# Patient Record
Sex: MALE | Race: WHITE | NOT HISPANIC OR LATINO | Employment: UNEMPLOYED | URBAN - METROPOLITAN AREA
[De-identification: names, ages, dates, MRNs, and addresses within clinical notes are randomized per-mention and may not be internally consistent; named-entity substitution may affect disease eponyms.]

---

## 2019-06-06 ENCOUNTER — APPOINTMENT (EMERGENCY)
Dept: RADIOLOGY | Facility: HOSPITAL | Age: 44
End: 2019-06-06
Payer: COMMERCIAL

## 2019-06-06 ENCOUNTER — HOSPITAL ENCOUNTER (EMERGENCY)
Facility: HOSPITAL | Age: 44
Discharge: HOME | End: 2019-06-06
Attending: EMERGENCY MEDICINE | Admitting: EMERGENCY MEDICINE
Payer: COMMERCIAL

## 2019-06-06 VITALS
RESPIRATION RATE: 16 BRPM | HEART RATE: 79 BPM | SYSTOLIC BLOOD PRESSURE: 119 MMHG | OXYGEN SATURATION: 96 % | WEIGHT: 157.63 LBS | TEMPERATURE: 97.8 F | DIASTOLIC BLOOD PRESSURE: 73 MMHG

## 2019-06-06 DIAGNOSIS — M25.532 ACUTE PAIN OF LEFT WRIST: Primary | ICD-10-CM

## 2019-06-06 PROCEDURE — 99283 EMERGENCY DEPT VISIT LOW MDM: CPT | Performed by: PHYSICIAN ASSISTANT

## 2019-06-06 PROCEDURE — 73110 X-RAY EXAM OF WRIST: CPT

## 2019-06-06 PROCEDURE — 99283 EMERGENCY DEPT VISIT LOW MDM: CPT

## 2019-06-06 PROCEDURE — 73130 X-RAY EXAM OF HAND: CPT

## 2020-07-20 ENCOUNTER — APPOINTMENT (EMERGENCY)
Dept: RADIOLOGY | Facility: HOSPITAL | Age: 45
End: 2020-07-20
Payer: COMMERCIAL

## 2020-07-20 ENCOUNTER — HOSPITAL ENCOUNTER (EMERGENCY)
Facility: HOSPITAL | Age: 45
Discharge: HOME | End: 2020-07-20
Payer: COMMERCIAL

## 2020-07-20 VITALS
WEIGHT: 150 LBS | RESPIRATION RATE: 18 BRPM | SYSTOLIC BLOOD PRESSURE: 122 MMHG | TEMPERATURE: 98.5 F | OXYGEN SATURATION: 98 % | HEIGHT: 66 IN | HEART RATE: 69 BPM | DIASTOLIC BLOOD PRESSURE: 81 MMHG | BODY MASS INDEX: 24.11 KG/M2

## 2020-07-20 DIAGNOSIS — R07.9 CHEST PAIN, UNSPECIFIED TYPE: Primary | ICD-10-CM

## 2020-07-20 LAB
ANION GAP SERPL CALCULATED.3IONS-SCNC: 10 MMOL/L (ref 4–13)
BASOPHILS # BLD AUTO: 0.06 THOUSANDS/ΜL (ref 0–0.1)
BASOPHILS NFR BLD AUTO: 1 % (ref 0–1)
BUN SERPL-MCNC: 14 MG/DL (ref 6–20)
CALCIUM SERPL-MCNC: 10.1 MG/DL (ref 8.4–10.2)
CHLORIDE SERPL-SCNC: 103 MMOL/L (ref 96–108)
CO2 SERPL-SCNC: 26 MMOL/L (ref 22–33)
CREAT SERPL-MCNC: 1.21 MG/DL (ref 0.5–1.2)
D DIMER PPP FEU-MCNC: <0.19 MG/L FEU (ref 0.19–0.49)
EOSINOPHIL # BLD AUTO: 0.13 THOUSAND/ΜL (ref 0–0.61)
EOSINOPHIL NFR BLD AUTO: 1 % (ref 0–6)
ERYTHROCYTE [DISTWIDTH] IN BLOOD BY AUTOMATED COUNT: 14 % (ref 11.6–15.1)
GFR SERPL CREATININE-BSD FRML MDRD: 73 ML/MIN/1.73SQ M
GLUCOSE SERPL-MCNC: 88 MG/DL (ref 65–140)
HCT VFR BLD AUTO: 48.9 % (ref 36.5–49.3)
HGB BLD-MCNC: 16.6 G/DL (ref 12–17)
IMM GRANULOCYTES # BLD AUTO: 0.03 THOUSAND/UL (ref 0–0.2)
IMM GRANULOCYTES NFR BLD AUTO: 0 % (ref 0–2)
LYMPHOCYTES # BLD AUTO: 1.8 THOUSANDS/ΜL (ref 0.6–4.47)
LYMPHOCYTES NFR BLD AUTO: 18 % (ref 14–44)
MCH RBC QN AUTO: 28.7 PG (ref 26.8–34.3)
MCHC RBC AUTO-ENTMCNC: 33.9 G/DL (ref 31.4–37.4)
MCV RBC AUTO: 85 FL (ref 82–98)
MONOCYTES # BLD AUTO: 0.93 THOUSAND/ΜL (ref 0.17–1.22)
MONOCYTES NFR BLD AUTO: 10 % (ref 4–12)
NEUTROPHILS # BLD AUTO: 6.83 THOUSANDS/ΜL (ref 1.85–7.62)
NEUTS SEG NFR BLD AUTO: 70 % (ref 43–75)
PLATELET # BLD AUTO: 297 THOUSANDS/UL (ref 149–390)
PMV BLD AUTO: 10.5 FL (ref 8.9–12.7)
POTASSIUM SERPL-SCNC: 4 MMOL/L (ref 3.5–5)
RBC # BLD AUTO: 5.79 MILLION/UL (ref 3.88–5.62)
SODIUM SERPL-SCNC: 139 MMOL/L (ref 133–145)
TROPONIN I SERPL-MCNC: <0.03 NG/ML (ref 0–0.07)
WBC # BLD AUTO: 9.78 THOUSAND/UL (ref 4.31–10.16)

## 2020-07-20 PROCEDURE — 71045 X-RAY EXAM CHEST 1 VIEW: CPT

## 2020-07-20 PROCEDURE — 80048 BASIC METABOLIC PNL TOTAL CA: CPT

## 2020-07-20 PROCEDURE — 99285 EMERGENCY DEPT VISIT HI MDM: CPT

## 2020-07-20 PROCEDURE — 36415 COLL VENOUS BLD VENIPUNCTURE: CPT

## 2020-07-20 PROCEDURE — 84484 ASSAY OF TROPONIN QUANT: CPT

## 2020-07-20 PROCEDURE — 85379 FIBRIN DEGRADATION QUANT: CPT

## 2020-07-20 PROCEDURE — 85025 COMPLETE CBC W/AUTO DIFF WBC: CPT

## 2020-07-20 RX ORDER — SODIUM CHLORIDE 9 MG/ML
3 INJECTION INTRAVENOUS
Status: DISCONTINUED | OUTPATIENT
Start: 2020-07-20 | End: 2020-07-20 | Stop reason: HOSPADM

## 2020-07-20 RX ORDER — ATORVASTATIN CALCIUM 10 MG/1
10 TABLET, FILM COATED ORAL DAILY
COMMUNITY
Start: 2020-06-01

## 2020-07-20 NOTE — ED PROVIDER NOTES
History  Chief Complaint   Patient presents with    Chest Pain     Reports intermittent LEFT sided CP that radiates into epigastric area  Denies cough, SOB  hx cholecystectomy     62-year-old male history of elevated cholesterol presents secondary to left-sided chest pain intermittently for the past few days  Patient is here today because his wife is concerned because the pain seemed worse today  Patient denies any palpitation denies any dyspnea patient denies any nausea or vomiting  Patient states he has had EKGs in a stress test a few years ago which were unremarkable  Patient states he has been under lot of stress lately  Patient is awake alert oriented no significant distress  Prior to Admission Medications   Prescriptions Last Dose Informant Patient Reported? Taking?   atorvastatin (LIPITOR) 10 mg tablet 7/20/2020 at Unknown time  Yes Yes   Sig: Take 10 mg by mouth daily       Facility-Administered Medications: None       Past Medical History:   Diagnosis Date    Hypercholesterolemia     [facility]     No known problems        Past Surgical History:   Procedure Laterality Date    CHOLECYSTECTOMY      CHOLECYSTECTOMY         History reviewed  No pertinent family history  I have reviewed and agree with the history as documented  E-Cigarette/Vaping    E-Cigarette Use Never User      E-Cigarette/Vaping Substances     Social History     Tobacco Use    Smoking status: Never Smoker    Smokeless tobacco: Never Used   Substance Use Topics    Alcohol use: Never     Frequency: Never    Drug use: Never       Review of Systems   Constitutional: Negative for chills and fever  HENT: Negative for congestion  Eyes: Negative for visual disturbance  Respiratory: Negative for shortness of breath  Cardiovascular: Positive for chest pain  Gastrointestinal: Negative for abdominal pain  Endocrine: Negative for cold intolerance  Genitourinary: Negative for frequency  Musculoskeletal: Negative for gait problem  Skin: Negative for rash  Neurological: Negative for dizziness  Psychiatric/Behavioral: Negative for behavioral problems and confusion  Physical Exam  Physical Exam   Constitutional: He is oriented to person, place, and time  He appears well-developed and well-nourished  HENT:   Head: Normocephalic and atraumatic  Eyes: Pupils are equal, round, and reactive to light  Conjunctivae and EOM are normal    Neck: Normal range of motion  Neck supple  Cardiovascular: Normal rate, regular rhythm and normal heart sounds  Pulmonary/Chest: Effort normal and breath sounds normal    Abdominal: Soft  Bowel sounds are normal    Musculoskeletal: Normal range of motion  Neurological: He is alert and oriented to person, place, and time  Skin: Skin is warm and dry  Capillary refill takes less than 2 seconds  Psychiatric: He has a normal mood and affect  His behavior is normal    Nursing note and vitals reviewed        Vital Signs  ED Triage Vitals [07/20/20 1034]   Temperature Pulse Respirations Blood Pressure SpO2   98 5 °F (36 9 °C) 76 16 132/83 96 %      Temp Source Heart Rate Source Patient Position - Orthostatic VS BP Location FiO2 (%)   Tympanic -- Sitting Left arm --      Pain Score       --           Vitals:    07/20/20 1034 07/20/20 1134   BP: 132/83 122/81   Pulse: 76 69   Patient Position - Orthostatic VS: Sitting Lying         Visual Acuity      ED Medications  Medications   sodium chloride (PF) 0 9 % injection 3 mL (has no administration in time range)       Diagnostic Studies  Results Reviewed     Procedure Component Value Units Date/Time    D-dimer, quantitative [[id number]]  (Abnormal) Collected:  07/20/20 1057    Lab Status:  Final result Specimen:  Blood from Arm, Right Updated:  07/20/20 1142     D-Dimer, Quant  <0 19 mg/L FEU     Troponin I [[id number]]  (Normal) Collected:  07/20/20 1057    Lab Status:  Final result Specimen:  Blood from Arm, Right Updated:  07/20/20 1126     Troponin I <0 03 ng/mL     CBC and differential [[id number]]  (Abnormal) Collected:  07/20/20 1057    Lab Status:  Final result Specimen:  Blood from Arm, Right Updated:  07/20/20 1103     WBC 9 78 Thousand/uL      RBC 5 79 Million/uL      Hemoglobin 16 6 g/dL      Hematocrit 48 9 %      MCV 85 fL      MCH 28 7 pg      MCHC 33 9 g/dL      RDW 14 0 %      MPV 10 5 fL      Platelets 820 Thousands/uL      Neutrophils Relative 70 %      Immat GRANS % 0 %      Lymphocytes Relative 18 %      Monocytes Relative 10 %      Eosinophils Relative 1 %      Basophils Relative 1 %      Neutrophils Absolute 6 83 Thousands/µL      Immature Grans Absolute 0 03 Thousand/uL      Lymphocytes Absolute 1 80 Thousands/µL      Monocytes Absolute 0 93 Thousand/µL      Eosinophils Absolute 0 13 Thousand/µL      Basophils Absolute 0 06 Thousands/µL     Basic metabolic panel [[id number]] Collected:  07/20/20 1057    Lab Status: In process Specimen:  Blood from Arm, Right Updated:  07/20/20 1100                 X-ray chest 1 view portable   Final Result by Aida Flores MD (07/20 1124)      No acute cardiopulmonary disease  Workstation performed: [id number]                    Procedures  ECG 12 Lead Documentation Only  Date/Time: 7/20/2020 10:59 AM  Performed by: Kim Langley MD  Authorized by: Kim Langley MD     Indications / Diagnosis:  Chest pain  ECG reviewed by me, the ED Provider: yes    Comments:      Normal sinus rhythm no acute ST T wave abnormalities normal axis normal intervals  ED Course       US AUDIT      Most Recent Value   Initial Alcohol Screen: US AUDIT-C    1  How often do you have a drink containing alcohol?  0 Filed at: 07/20/2020 1037   2  How many drinks containing alcohol do you have on a typical day you are drinking? 0 Filed at: 07/20/2020 1037   3a  Male UNDER 65: How often do you have five or more drinks on one occasion?   0 Filed at: 07/20/2020 1037 3b  FEMALE Any Age, or MALE 65+: How often do you have 4 or more drinks on one occassion? 0 Filed at: 07/20/2020 1037   Audit-C Score  0 Filed at: 07/20/2020 1037                  MONALISA/DAST-10      Most Recent Value   How many times in the past year have you    Used an illegal drug or used a prescription medication for non-medical reasons? Never Filed at: 07/20/2020 1037                                MDM  Patient was monitored in the emergency department for approximately 2 hours  Patient denied any chest pain while here in the emergency department  Patient's chest x-ray demonstrated no acute abnormality patient's blood work demonstrated no acute abnormalities troponin was not elevated  I spoke to patient he does have cardiac risk factors and recommended she comes in for cardiac monitoring and chest pain cardiac rule out  Patient absolutely refuses admission for this  Patient states he does in the [place] knows he is not from around here he does not want to be admitted to this hospital secondary to months ago and see his own doctor  Patient understands that his chest pain may be of cardiac etiology could be dangerous for him to leave including possibility of death he is of sound mind and understands this  Patient assures me he will call his doctor when he gets home today to set up an appointment    Disposition  Final diagnoses:   Chest pain, unspecified type     Time reflects when diagnosis was documented in both MDM as applicable and the Disposition within this note     Time User Action Codes Description Comment    7/20/2020 11:48 AM Brad Simpson Add [R07 9] Chest pain, unspecified type       ED Disposition     ED Disposition Condition Date/Time Comment    Discharge Stable Mon Jul 20, 2020 11:48 AM Monroe Aguirre discharge to home/self care              Follow-up Information     Follow up With Specialties Details Why Teri Epstein MD [facility] Medicine Schedule an appointment as soon as possible for a visit in 1 day  [address]  [phone]            Patient's Medications   Discharge Prescriptions    No medications on file     No discharge procedures on file      PDMP Review     None          ED Provider  Electronically Signed by           Jim Noel MD  07/20/20 8870

## 2020-09-27 ENCOUNTER — APPOINTMENT (EMERGENCY)
Dept: RADIOLOGY | Facility: HOSPITAL | Age: 45
End: 2020-09-27
Payer: COMMERCIAL

## 2020-09-27 ENCOUNTER — HOSPITAL ENCOUNTER (EMERGENCY)
Facility: HOSPITAL | Age: 45
Discharge: HOME | End: 2020-09-27
Attending: EMERGENCY MEDICINE
Payer: COMMERCIAL

## 2020-09-27 VITALS
HEART RATE: 52 BPM | BODY MASS INDEX: 23.53 KG/M2 | RESPIRATION RATE: 14 BRPM | OXYGEN SATURATION: 98 % | WEIGHT: 145.8 LBS | DIASTOLIC BLOOD PRESSURE: 70 MMHG | SYSTOLIC BLOOD PRESSURE: 117 MMHG | TEMPERATURE: 98.2 F

## 2020-09-27 DIAGNOSIS — R07.9 CHEST PAIN, UNSPECIFIED: Primary | ICD-10-CM

## 2020-09-27 LAB
ALBUMIN SERPL BCP-MCNC: 3.8 G/DL (ref 3.5–5)
ALP SERPL-CCNC: 60 U/L (ref 46–116)
ALT SERPL W P-5'-P-CCNC: 49 U/L (ref 12–78)
ANION GAP SERPL CALCULATED.3IONS-SCNC: 9 MMOL/L (ref 4–13)
AST SERPL W P-5'-P-CCNC: 28 U/L (ref 5–45)
BASOPHILS # BLD AUTO: 0.1 THOUSANDS/ΜL (ref 0–0.1)
BASOPHILS NFR BLD AUTO: 1 % (ref 0–1)
BILIRUB SERPL-MCNC: 0.6 MG/DL (ref 0.2–1)
BUN SERPL-MCNC: 14 MG/DL (ref 5–25)
CALCIUM SERPL-MCNC: 9.2 MG/DL (ref 8.3–10.1)
CHLORIDE SERPL-SCNC: 103 MMOL/L (ref 100–108)
CO2 SERPL-SCNC: 27 MMOL/L (ref 21–32)
CREAT SERPL-MCNC: 1.25 MG/DL (ref 0.6–1.3)
EOSINOPHIL # BLD AUTO: 0.26 THOUSAND/ΜL (ref 0–0.61)
EOSINOPHIL NFR BLD AUTO: 3 % (ref 0–6)
ERYTHROCYTE [DISTWIDTH] IN BLOOD BY AUTOMATED COUNT: 12.8 % (ref 11.6–15.1)
GFR SERPL CREATININE-BSD FRML MDRD: 70 ML/MIN/1.73SQ M
GLUCOSE SERPL-MCNC: 89 MG/DL (ref 65–140)
HCT VFR BLD AUTO: 51.1 % (ref 36.5–49.3)
HGB BLD-MCNC: 16.9 G/DL (ref 12–17)
IMM GRANULOCYTES # BLD AUTO: 0.03 THOUSAND/UL (ref 0–0.2)
IMM GRANULOCYTES NFR BLD AUTO: 0 % (ref 0–2)
LYMPHOCYTES # BLD AUTO: 2.28 THOUSANDS/ΜL (ref 0.6–4.47)
LYMPHOCYTES NFR BLD AUTO: 22 % (ref 14–44)
MCH RBC QN AUTO: 29.6 PG (ref 26.8–34.3)
MCHC RBC AUTO-ENTMCNC: 33.1 G/DL (ref 31.4–37.4)
MCV RBC AUTO: 90 FL (ref 82–98)
MONOCYTES # BLD AUTO: 0.69 THOUSAND/ΜL (ref 0.17–1.22)
MONOCYTES NFR BLD AUTO: 7 % (ref 4–12)
NEUTROPHILS # BLD AUTO: 7 THOUSANDS/ΜL (ref 1.85–7.62)
NEUTS SEG NFR BLD AUTO: 67 % (ref 43–75)
NRBC BLD AUTO-RTO: 0 /100 WBCS
PLATELET # BLD AUTO: 281 THOUSANDS/UL (ref 149–390)
PMV BLD AUTO: 10.5 FL (ref 8.9–12.7)
POTASSIUM SERPL-SCNC: 4 MMOL/L (ref 3.5–5.3)
PROT SERPL-MCNC: 7.3 G/DL (ref 6.4–8.2)
RBC # BLD AUTO: 5.71 MILLION/UL (ref 3.88–5.62)
SODIUM SERPL-SCNC: 139 MMOL/L (ref 136–145)
TROPONIN I SERPL-MCNC: <0.02 NG/ML
WBC # BLD AUTO: 10.36 THOUSAND/UL (ref 4.31–10.16)

## 2020-09-27 PROCEDURE — 93005 ELECTROCARDIOGRAM TRACING: CPT

## 2020-09-27 PROCEDURE — 99285 EMERGENCY DEPT VISIT HI MDM: CPT

## 2020-09-27 PROCEDURE — 71045 X-RAY EXAM CHEST 1 VIEW: CPT

## 2020-09-27 PROCEDURE — 84484 ASSAY OF TROPONIN QUANT: CPT

## 2020-09-27 PROCEDURE — 85025 COMPLETE CBC W/AUTO DIFF WBC: CPT

## 2020-09-27 PROCEDURE — 99285 EMERGENCY DEPT VISIT HI MDM: CPT | Performed by: EMERGENCY MEDICINE

## 2020-09-27 PROCEDURE — 36415 COLL VENOUS BLD VENIPUNCTURE: CPT

## 2020-09-27 PROCEDURE — 80053 COMPREHEN METABOLIC PANEL: CPT

## 2020-09-27 RX ORDER — IBUPROFEN 600 MG/1
600 TABLET ORAL ONCE
Status: COMPLETED | OUTPATIENT
Start: 2020-09-27 | End: 2020-09-27

## 2020-09-27 RX ADMIN — IBUPROFEN 600 MG: 600 TABLET, FILM COATED ORAL at 20:56

## 2020-09-27 NOTE — ED PROVIDER NOTES
History  Chief Complaint   Patient presents with    Chest Pain     Pt c/o on and off chest pain since 10 am   Pt describes pain as throbbing pain to left chest      Patient here with complaint of intermittent left-sided chest pain that began approximately 9:00 a m  This morning  Patient patient states that pain lasts approximately 1-2 minutes and resolves spontaneously  He describes pain as a throbbing  Patient with a history of similar pain a few months ago and was evaluated at this hospital   Patient has a previous history of hyperlipidemia and states that he is compliant with his medications  Patient denies cough, fevers, chills, sick contacts  History provided by:  Patient   used: No    Chest Pain   Pain location:  L chest  Pain quality: throbbing    Pain radiates to:  Does not radiate  Pain radiates to the back: no    Pain severity:  Mild  Onset quality:  Sudden  Timing:  Intermittent  Chronicity:  New  Associated symptoms: no abdominal pain, no back pain, no cough, no dysphagia, no fever, no headache, no nausea, no palpitations, no shortness of breath and not vomiting        Prior to Admission Medications   Prescriptions Last Dose Informant Patient Reported? Taking?   atorvastatin (LIPITOR) 10 mg tablet 9/27/2020 at Unknown time  Yes Yes   Sig: Take 10 mg by mouth daily       Facility-Administered Medications: None       Past Medical History:   Diagnosis Date    Hypercholesterolemia     [facility]     No known problems        Past Surgical History:   Procedure Laterality Date    CHOLECYSTECTOMY      CHOLECYSTECTOMY         History reviewed  No pertinent family history  I have reviewed and agree with the history as documented      E-Cigarette/Vaping    E-Cigarette Use Never User      E-Cigarette/Vaping Substances     Social History     Tobacco Use    Smoking status: Never Smoker    Smokeless tobacco: Never Used   Substance Use Topics    Alcohol use: Never Frequency: Never    Drug use: Never       Review of Systems   Constitutional: Negative for chills and fever  HENT: Negative for facial swelling and trouble swallowing  Eyes: Negative for photophobia, pain and redness  Respiratory: Negative for cough, shortness of breath and wheezing  Cardiovascular: Positive for chest pain  Negative for palpitations  Gastrointestinal: Negative for abdominal pain, constipation, diarrhea, nausea and vomiting  Genitourinary: Negative for dysuria, flank pain, hematuria and urgency  Musculoskeletal: Negative for back pain and neck pain  Skin: Negative for color change and rash  Neurological: Negative for light-headedness and headaches  Psychiatric/Behavioral: Negative for agitation, behavioral problems and confusion  All other systems reviewed and are negative  Physical Exam  Physical Exam  Vitals signs and nursing note reviewed  Constitutional:       Appearance: He is well-developed  HENT:      Head: Normocephalic and atraumatic  Eyes:      Pupils: Pupils are equal, round, and reactive to light  Cardiovascular:      Rate and Rhythm: Normal rate and regular rhythm  Heart sounds: Normal heart sounds  Pulmonary:      Effort: Pulmonary effort is normal  No tachypnea or accessory muscle usage  Breath sounds: Normal breath sounds  No decreased breath sounds, wheezing, rhonchi or rales  Abdominal:      General: Bowel sounds are normal  There is no distension  Palpations: Abdomen is soft  There is no mass  Tenderness: There is no abdominal tenderness  There is no guarding or rebound  Skin:     General: Skin is warm and dry  Capillary Refill: Capillary refill takes less than 2 seconds  Neurological:      General: No focal deficit present  Mental Status: He is alert and oriented to person, place, and time  Psychiatric:         Mood and Affect: Mood is not anxious           Behavior: Behavior normal          Thought Content:  Thought content normal          Judgment: Judgment normal          Vital Signs  ED Triage Vitals [09/27/20 1936]   Temperature Pulse Respirations Blood Pressure SpO2   98 2 °F (36 8 °C) 67 18 154/85 96 %      Temp Source Heart Rate Source Patient Position - Orthostatic VS BP Location FiO2 (%)   Oral Monitor Sitting Left arm --      Pain Score       7           Vitals:    09/27/20 1936 09/27/20 1945 09/27/20 2015 09/27/20 2045   BP: 154/85 135/84 135/88 117/70   Pulse: 67 68 60 (!) 52   Patient Position - Orthostatic VS: Sitting            Visual Acuity      ED Medications  Medications   ibuprofen (MOTRIN) tablet 600 mg (600 mg Oral Given 9/27/20 2056)       Diagnostic Studies  Results Reviewed     Procedure Component Value Units Date/Time    Troponin I [[id number]]  (Normal) Collected:  09/27/20 1947    Lab Status:  Final result Specimen:  Blood from Arm, Right Updated:  09/27/20 2012     Troponin I <0 02 ng/mL     Comprehensive metabolic panel [[id number]] Collected:  09/27/20 1947    Lab Status:  Final result Specimen:  Blood from Arm, Right Updated:  09/27/20 2010     Sodium 139 mmol/L      Potassium 4 0 mmol/L      Chloride 103 mmol/L      CO2 27 mmol/L      ANION GAP 9 mmol/L      BUN 14 mg/dL      Creatinine 1 25 mg/dL      Glucose 89 mg/dL      Calcium 9 2 mg/dL      AST 28 U/L      ALT 49 U/L      Alkaline Phosphatase 60 U/L      Total Protein 7 3 g/dL      Albumin 3 8 g/dL      Total Bilirubin 0 60 mg/dL      eGFR 70 ml/min/1 73sq m     Narrative:       Ricardo guidelines for Chronic Kidney Disease (CKD):     Stage 1 with normal or high GFR (GFR > 90 mL/min/1 73 square meters)    Stage 2 Mild CKD (GFR = 60-89 mL/min/1 73 square meters)    Stage 3A Moderate CKD (GFR = 45-59 mL/min/1 73 square meters)    Stage 3B Moderate CKD (GFR = 30-44 mL/min/1 73 square meters)    Stage 4 Severe CKD (GFR = 15-29 mL/min/1 73 square meters)    Stage 5 End Stage CKD (GFR <15 mL/min/1 73 square meters)  Note: GFR calculation is accurate only with a steady state creatinine    CBC and differential [[id number]]  (Abnormal) Collected:  09/27/20 1947    Lab Status:  Final result Specimen:  Blood from Arm, Right Updated:  09/27/20 1953     WBC 10 36 Thousand/uL      RBC 5 71 Million/uL      Hemoglobin 16 9 g/dL      Hematocrit 51 1 %      MCV 90 fL      MCH 29 6 pg      MCHC 33 1 g/dL      RDW 12 8 %      MPV 10 5 fL      Platelets 741 Thousands/uL      nRBC 0 /100 WBCs      Neutrophils Relative 67 %      Immat GRANS % 0 %      Lymphocytes Relative 22 %      Monocytes Relative 7 %      Eosinophils Relative 3 %      Basophils Relative 1 %      Neutrophils Absolute 7 00 Thousands/µL      Immature Grans Absolute 0 03 Thousand/uL      Lymphocytes Absolute 2 28 Thousands/µL      Monocytes Absolute 0 69 Thousand/µL      Eosinophils Absolute 0 26 Thousand/µL      Basophils Absolute 0 10 Thousands/µL                  XR chest 1 view portable   ED Interpretation by Natalia Conteh DO (09/27 2040)   nad                 Procedures  ECG 12 Lead Documentation Only    Date/Time: 9/27/2020 7:43 PM  Performed by: Natalia Conteh DO  Authorized by: Natalia Conteh DO     Indications / Diagnosis:  Chest pain  ECG reviewed by me, the ED Provider: yes    Patient location:  ED  Previous ECG:     Previous ECG:  Unavailable    Comparison to cardiac monitor: Yes    Interpretation:     Interpretation: normal    Rate:     ECG rate:  67bpm    ECG rate assessment: normal    Rhythm:     Rhythm: sinus rhythm    Ectopy:     Ectopy: none    QRS:     QRS axis:  Normal  Conduction:     Conduction: normal    ST segments:     ST segments:  Normal  T waves:     T waves: normal               ED Course           HEART Risk Score      Most Recent Value   Heart Score Risk Calculator   History  0 Filed at: 09/27/2020 2025   ECG  0 Filed at: 09/27/2020 2025   Age  0 Filed at: 09/27/2020 2025   Risk Factors  1 Filed at: 09/27/2020 2025   Troponin  0 Filed at: 09/27/2020 2025   HEART Score  1 Filed at: 09/27/2020 2025                                  MDM  Number of Diagnoses or Management Options  Chest pain, unspecified:   Diagnosis management comments: Patient here with complaint of intermittent left-sided chest pain  Heart score is 1  EKG negative for acute pathology  Labs reviewed and negative for acute pathology  Patient is currently pain-free  Will discharge home with primary care physician and cardiology follow-up  Patient returns emergency room for further concerns  Amount and/or Complexity of Data Reviewed  Clinical lab tests: ordered and reviewed  Tests in the radiology section of CPT®: ordered and reviewed    Risk of Complications, Morbidity, and/or Mortality  Presenting problems: moderate  Diagnostic procedures: moderate  Management options: moderate    Patient Progress  Patient progress: stable      Disposition  Final diagnoses:   Chest pain, unspecified     Time reflects when diagnosis was documented in both MDM as applicable and the Disposition within this note     Time User Action Codes Description Comment    9/27/2020  8:42 PM Colon Batter Add [R07 9] Chest pain, unspecified       ED Disposition     ED Disposition Condition Date/Time Comment    Discharge Stable Sun Sep 27, 2020  8:41 PM Rey Kanner discharge to home/self care              Follow-up Information     Follow up With Specialties Details Why Contact Info Additional Information    Freya Meigs, MD [facility] Medicine Schedule an appointment as soon as possible for a visit in 2 days for follow up [address] [place] [id number]  [address] [place] [address] Cardiology Schedule an appointment as soon as possible for a visit in 2 days for follow up [address] [facility], [facility] [address] [facility] [address]  [address] [facility] [address], [address], [place], [place], [address] [facility]          Discharge Medication List as of 9/27/2020  8:43 PM      CONTINUE these medications which have NOT CHANGED    Details   atorvastatin (LIPITOR) 10 mg tablet Take 10 mg by mouth daily , Starting Mon 6/1/2020, Historical Med           No discharge procedures on file      PDMP Review     None          ED Provider  Electronically Signed by           Ana Rosa Gallegos DO  09/27/20 6607

## 2020-09-28 ENCOUNTER — TELEPHONE (OUTPATIENT)
Dept: OTHER | Facility: OTHER | Age: 45
End: 2020-09-28

## 2020-09-28 ENCOUNTER — CONSULT (OUTPATIENT)
Dept: CARDIOLOGY CLINIC | Facility: CLINIC | Age: 45
End: 2020-09-28
Payer: COMMERCIAL

## 2020-09-28 VITALS
OXYGEN SATURATION: 97 % | HEIGHT: 66 IN | TEMPERATURE: 98.5 F | HEART RATE: 65 BPM | WEIGHT: 144 LBS | DIASTOLIC BLOOD PRESSURE: 80 MMHG | SYSTOLIC BLOOD PRESSURE: 100 MMHG | BODY MASS INDEX: 23.14 KG/M2

## 2020-09-28 DIAGNOSIS — R07.9 CHEST PAIN IN ADULT: ICD-10-CM

## 2020-09-28 DIAGNOSIS — R07.9 CHEST PAIN, UNSPECIFIED TYPE: Primary | ICD-10-CM

## 2020-09-28 PROCEDURE — 93000 ELECTROCARDIOGRAM COMPLETE: CPT | Performed by: INTERNAL MEDICINE

## 2020-09-28 PROCEDURE — 99244 OFF/OP CNSLTJ NEW/EST MOD 40: CPT | Performed by: INTERNAL MEDICINE

## 2020-09-28 NOTE — PROGRESS NOTES
[facility]  [address] [place] [address] [address]   [facility], [address]  Cardiology Consultation    Travis Wisdom  [id number]  1975      Consult for:  Atypical chest pain  Appreciate consult by: Ronit Gonzalez MD    1  Chest pain, unspecified type  POCT ECG      Discussion/Summary:   Chest pain- reports strong family history for premature coronary artery disease, hyperlipidemia  Ruled out for acute coronary syndrome in the ER  His baseline EKG does not show any acute ST T-wave changes  Plan for exercise treadmill stress test to evaluate for inducible ischemia  Reports possible kidney stone? Secondary to elevated blood pressure? Hyperlipidemia- obtain prior lipid blood work  Currently on atorvastatin  HPI:   43-year-old with prior history hyperlipidemia, family history for premature coronary artery disease presents with recent ER visit secondary to chest discomfort  Reported having left-sided severe chest pain while driving  He reports possibly passing a kidney stone  However he has had on and off pain at times sharp  He has been on statin medication  He denies having any prior risk stratification  He denies having prior myocardial infarction  He denies having elevated blood pressure        PMH  Hld- take cholesterol pill everyday- atorvastatin 10mg    Social Hx  Lives with daughter/parents  Odd jobs  Not much exercise  No smoking  No alcohol    Family Hx  Dad- MI at 28- 77    Past Medical History:   Diagnosis Date    Hypercholesterolemia     Mood disorder ([facility] [address] )     No known problems      Social History     Socioeconomic History    Marital status: Single     Spouse name: Not on file    Number of children: Not on file    Years of education: Not on file    Highest education level: Not on file   Occupational History    Not on file   Social Needs    Financial resource strain: Not on file    Food insecurity     Worry: Not on file     Inability: Not on file   [employer] Transportation needs     Medical: Not on file     Non-medical: Not on file   Tobacco Use    Smoking status: Never Smoker    Smokeless tobacco: Never Used   Substance and Sexual Activity    Alcohol use: Never     Frequency: Never    Drug use: Never    Sexual activity: Not on file   Lifestyle    Physical activity     Days per week: Not on file     Minutes per session: Not on file    Stress: Not on file   Relationships    Social connections     Talks on phone: Not on file     Gets together: Not on file     Attends [employer] service: Not on file     Active member of club or organization: Not on file     Attends meetings of clubs or organizations: Not on file     Relationship status: Not on file    Intimate partner violence     Fear of current or ex partner: Not on file     Emotionally abused: Not on file     Physically abused: Not on file     Forced sexual activity: Not on file   Other Topics Concern    Not on file   Social History Narrative    Not on file      Family History   Problem Relation Age of Onset    Hypertension Mother     Stroke Mother     Hypertension Father     Cancer Father     Heart attack Father     Stroke Father      Past Surgical History:   Procedure Laterality Date    CHOLECYSTECTOMY      CHOLECYSTECTOMY         Current Outpatient Medications:     atorvastatin (LIPITOR) 10 mg tablet, Take 10 mg by mouth daily , Disp: , Rfl:   No current facility-administered medications for this visit  No Known Allergies  Vitals:    09/28/20 1620   BP: 100/80   BP Location: Right arm   Patient Position: Sitting   Cuff Size: Standard   Pulse: 65   Temp: 98 5 °F (36 9 °C)   TempSrc: Temporal   SpO2: 97%   Weight: 65 3 kg (144 lb)   Height: 5' 6" (1 676 m)       Review of Systems:   Review of Systems   Constitutional: Negative  HENT: Negative  Eyes: Negative  Respiratory: Negative  Cardiovascular: Positive for chest pain  Gastrointestinal: Negative  Endocrine: Negative  Genitourinary: Negative  Musculoskeletal: Negative  Skin: Negative  Allergic/Immunologic: Negative  Neurological: Negative  Hematological: Negative  Psychiatric/Behavioral: Negative  Vitals:    09/28/20 1620   BP: 100/80   BP Location: Right arm   Patient Position: Sitting   Cuff Size: Standard   Pulse: 65   Temp: 98 5 °F (36 9 °C)   TempSrc: Temporal   SpO2: 97%   Weight: 65 3 kg (144 lb)   Height: 5' 6" (1 676 m)     Physical Examination:   Physical Exam  Constitutional:       General: He is not in acute distress  Appearance: He is well-developed  He is not diaphoretic  HENT:      Head: Normocephalic and atraumatic  Right Ear: External ear normal       Left Ear: External ear normal    Eyes:      General: No scleral icterus  Right eye: No discharge  Left eye: No discharge  Conjunctiva/sclera: Conjunctivae normal       Pupils: Pupils are equal, round, and reactive to light  Neck:      Musculoskeletal: Normal range of motion and neck supple  Thyroid: No thyromegaly  Vascular: No JVD  Trachea: No tracheal deviation  Cardiovascular:      Rate and Rhythm: Normal rate and regular rhythm  Heart sounds: No murmur  No friction rub  No gallop  Pulmonary:      Effort: Pulmonary effort is normal  No respiratory distress  Breath sounds: Normal breath sounds  No stridor  No wheezing or rales  Chest:      Chest wall: No tenderness  Abdominal:      General: Bowel sounds are normal  There is no distension  Palpations: Abdomen is soft  There is no mass  Tenderness: There is no abdominal tenderness  There is no guarding or rebound  Musculoskeletal: Normal range of motion  General: No tenderness or deformity  Skin:     General: Skin is warm and dry  Coloration: Skin is not pale  Findings: No erythema or rash  Neurological:      Mental Status: He is alert and oriented to person, place, and time        Cranial Nerves: No cranial nerve deficit  Motor: No abnormal muscle tone  Coordination: Coordination normal       Deep Tendon Reflexes: Reflexes are normal and symmetric  Reflexes normal    Psychiatric:         Behavior: Behavior normal          Thought Content:  Thought content normal          Judgment: Judgment normal          Labs:     Lab Results   Component Value Date    WBC 10 36 (H) 09/27/2020    HGB 16 9 09/27/2020    HCT 51 1 (H) 09/27/2020    MCV 90 09/27/2020    RDW 12 8 09/27/2020     09/27/2020     BMP:  Lab Results   Component Value Date    SODIUM 139 09/27/2020    K 4 0 09/27/2020     09/27/2020    CO2 27 09/27/2020    BUN 14 09/27/2020    CREATININE 1 25 09/27/2020    GLUC 89 09/27/2020    CALCIUM 9 2 09/27/2020    EGFR 70 09/27/2020     LFT:  Lab Results   Component Value Date    AST 28 09/27/2020    ALT 49 09/27/2020    ALKPHOS 60 09/27/2020    TP 7 3 09/27/2020    ALB 3 8 09/27/2020      No results found for: [id number]  No results found for: HGBA1C  Lipid Profile:   No results found for: CHOLESTEROL, HDL, LDLCALC, TRIG  No results found for: CHOLESTEROL  Lab Results   Component Value Date    TROPONINI <0 02 09/27/2020     No results found for: NTBNP   Recent Results (from the past 672 hour(s))   CBC and differential    Collection Time: 09/27/20  7:47 PM   Result Value Ref Range    WBC 10 36 (H) 4 31 - 10 16 Thousand/uL    RBC 5 71 (H) 3 88 - 5 62 Million/uL    Hemoglobin 16 9 12 0 - 17 0 g/dL    Hematocrit 51 1 (H) 36 5 - 49 3 %    MCV 90 82 - 98 fL    MCH 29 6 26 8 - 34 3 pg    MCHC 33 1 31 4 - 37 4 g/dL    RDW 12 8 11 6 - 15 1 %    MPV 10 5 8 9 - 12 7 fL    Platelets 936 173 - 945 Thousands/uL    nRBC 0 /100 WBCs    Neutrophils Relative 67 43 - 75 %    Immat GRANS % 0 0 - 2 %    Lymphocytes Relative 22 14 - 44 %    Monocytes Relative 7 4 - 12 %    Eosinophils Relative 3 0 - 6 %    Basophils Relative 1 0 - 1 %    Neutrophils Absolute 7 00 1 85 - 7 62 Thousands/µL    Immature Grans Absolute 0 03 0 00 - 0 20 Thousand/uL    Lymphocytes Absolute 2 28 0 60 - 4 47 Thousands/µL    Monocytes Absolute 0 69 0 17 - 1 22 Thousand/µL    Eosinophils Absolute 0 26 0 00 - 0 61 Thousand/µL    Basophils Absolute 0 10 0 00 - 0 10 Thousands/µL   Comprehensive metabolic panel    Collection Time: 09/27/20  7:47 PM   Result Value Ref Range    Sodium 139 136 - 145 mmol/L    Potassium 4 0 3 5 - 5 3 mmol/L    Chloride 103 100 - 108 mmol/L    CO2 27 21 - 32 mmol/L    ANION GAP 9 4 - 13 mmol/L    BUN 14 5 - 25 mg/dL    Creatinine 1 25 0 60 - 1 30 mg/dL    Glucose 89 65 - 140 mg/dL    Calcium 9 2 8 3 - 10 1 mg/dL    AST 28 5 - 45 U/L    ALT 49 12 - 78 U/L    Alkaline Phosphatase 60 46 - 116 U/L    Total Protein 7 3 6 4 - 8 2 g/dL    Albumin 3 8 3 5 - 5 0 g/dL    Total Bilirubin 0 60 0 20 - 1 00 mg/dL    eGFR 70 ml/min/1 73sq m   Troponin I    Collection Time: 09/27/20  7:47 PM   Result Value Ref Range    Troponin I <0 02 <=0 04 ng/mL       Imaging & Testing   I have personally reviewed pertinent reports  Cardiac Testing     EKG: Personally reviewed  Normal sinus rhythm no acute ST T wave changes      Nena Bradshaw MD [facility]  [phone]  Please call with any questions or suggestions    Counseling :  A description of the counseling:   Goals and Barriers:  Patient's ability to self care:  Medication side effect reviewed with patient in detail and all their questions answered  "Portions of the record may have been created with voice recognition software  Occasional wrong word or "sound a like" substitutions may have occurred due to the inherent limitations of voice recognition software  Read the chart carefully and recognize, using context, where substitutions have occurred   Please call if you have any questions  "

## 2020-09-28 NOTE — DISCHARGE INSTRUCTIONS
Return to the ER for further concerns or worsening symptoms  Follow up with your primary care physician and cardiology in 1-2 days  Take tylenol or motrin for pain relief

## 2020-09-30 LAB
ATRIAL RATE: 67 BPM
P AXIS: 53 DEGREES
PR INTERVAL: 120 MS
QRS AXIS: 29 DEGREES
QRSD INTERVAL: 102 MS
QT INTERVAL: 380 MS
QTC INTERVAL: 401 MS
T WAVE AXIS: 39 DEGREES
VENTRICULAR RATE: 67 BPM

## 2020-09-30 PROCEDURE — 93010 ELECTROCARDIOGRAM REPORT: CPT | Performed by: INTERNAL MEDICINE

## 2022-10-04 ENCOUNTER — TELEPHONE (OUTPATIENT)
Dept: CARDIOLOGY CLINIC | Facility: CLINIC | Age: 47
End: 2022-10-04

## 2022-10-04 NOTE — TELEPHONE ENCOUNTER
Hello, Pt called DAYANA w our  with questions  I see he saw Dr Beth Marie in 2020 and is not an EP patient  Is someone able to call this pt please? Thank you!

## 2024-07-01 ENCOUNTER — HOSPITAL ENCOUNTER (EMERGENCY)
Facility: HOSPITAL | Age: 49
Discharge: HOME | End: 2024-07-01
Attending: EMERGENCY MEDICINE
Payer: COMMERCIAL

## 2024-07-01 VITALS
TEMPERATURE: 98.1 F | BODY MASS INDEX: 29.05 KG/M2 | DIASTOLIC BLOOD PRESSURE: 81 MMHG | RESPIRATION RATE: 16 BRPM | HEART RATE: 71 BPM | OXYGEN SATURATION: 96 % | SYSTOLIC BLOOD PRESSURE: 131 MMHG | WEIGHT: 180 LBS

## 2024-07-01 DIAGNOSIS — S73.101A THIGH SPRAIN, RIGHT, INITIAL ENCOUNTER: Primary | ICD-10-CM

## 2024-07-01 PROCEDURE — 99282 EMERGENCY DEPT VISIT SF MDM: CPT

## 2024-07-01 PROCEDURE — 99284 EMERGENCY DEPT VISIT MOD MDM: CPT | Performed by: EMERGENCY MEDICINE

## 2024-07-02 NOTE — ED PROVIDER NOTES
History  Chief Complaint   Patient presents with    Leg Pain     Intermittent discomfort to posterior R thigh for a couple of days. Denies any known trauma. No swelling or redness. No pain now. More on movement     48-year-old male presenting to ED today with right eye pain.  Patient states that this pain started about 2 days ago.  Intermittent pain in the right eye.  He says that initially felt like it was in the groin and intermittently it was in the posterior thigh.  He currently does not have any pain right now.  He came in because his significant other was concerned about blood clot.  He has no history of blood clot in the past.  He had no pain right now.  He has not take anything for his pain.        Prior to Admission Medications   Prescriptions Last Dose Informant Patient Reported? Taking?   atorvastatin (LIPITOR) 10 mg tablet  Self Yes No   Sig: Take 10 mg by mouth daily       Facility-Administered Medications: None       Past Medical History:   Diagnosis Date    Hypercholesterolemia     Mood disorder (HCC)     No known problems        Past Surgical History:   Procedure Laterality Date    CHOLECYSTECTOMY      CHOLECYSTECTOMY      ORTHOPEDIC SURGERY         Family History   Problem Relation Age of Onset    Hypertension Mother     Stroke Mother     Hypertension Father     Cancer Father     Heart attack Father     Stroke Father      I have reviewed and agree with the history as documented.    E-Cigarette/Vaping    E-Cigarette Use Never User      E-Cigarette/Vaping Substances     Social History     Tobacco Use    Smoking status: Never    Smokeless tobacco: Never   Vaping Use    Vaping status: Never Used   Substance Use Topics    Alcohol use: Never    Drug use: Never       Review of Systems   Constitutional:  Negative for chills and fever.   HENT:  Negative for hearing loss.    Eyes:  Negative for visual disturbance.   Respiratory:  Negative for shortness of breath.    Cardiovascular:  Negative for chest pain.    Gastrointestinal:  Negative for abdominal pain, constipation, diarrhea, nausea and vomiting.   Genitourinary:  Negative for difficulty urinating.   Musculoskeletal:  Negative for myalgias.   Skin:  Negative for rash.   Neurological:  Negative for dizziness.   Psychiatric/Behavioral:  Negative for agitation.    All other systems reviewed and are negative.      Physical Exam  Physical Exam  Vitals and nursing note reviewed.   Constitutional:       General: He is not in acute distress.     Appearance: Normal appearance. He is not ill-appearing.   HENT:      Head: Normocephalic and atraumatic.      Right Ear: External ear normal.      Left Ear: External ear normal.      Nose: Nose normal. No congestion.      Mouth/Throat:      Mouth: Mucous membranes are moist.      Pharynx: Oropharynx is clear. No oropharyngeal exudate.   Eyes:      General:         Right eye: No discharge.         Left eye: No discharge.      Extraocular Movements: Extraocular movements intact.      Conjunctiva/sclera: Conjunctivae normal.      Pupils: Pupils are equal, round, and reactive to light.   Cardiovascular:      Rate and Rhythm: Normal rate and regular rhythm.      Pulses: Normal pulses.      Heart sounds: Normal heart sounds.   Pulmonary:      Effort: Pulmonary effort is normal. No respiratory distress.      Breath sounds: Normal breath sounds. No wheezing.   Abdominal:      General: Abdomen is flat. Bowel sounds are normal. There is no distension.      Palpations: Abdomen is soft.      Tenderness: There is no abdominal tenderness.   Musculoskeletal:         General: No swelling or deformity. Normal range of motion.      Cervical back: Normal range of motion. No rigidity.      Comments: No pain with passive range of motion of the right lower extremity.  No pain in the deep venous system of the right leg.  No lower leg edema bilaterally.   Skin:     General: Skin is warm and dry.      Capillary Refill: Capillary refill takes less than 2  seconds.   Neurological:      General: No focal deficit present.      Mental Status: He is alert and oriented to person, place, and time. Mental status is at baseline.      Cranial Nerves: No cranial nerve deficit.      Motor: No weakness.      Gait: Gait normal.   Psychiatric:         Mood and Affect: Mood normal.         Behavior: Behavior normal.         Vital Signs  ED Triage Vitals [07/01/24 2208]   Temperature Pulse Respirations Blood Pressure SpO2   98.1 °F (36.7 °C) 71 16 131/81 96 %      Temp Source Heart Rate Source Patient Position - Orthostatic VS BP Location FiO2 (%)   Tympanic Monitor Sitting Right arm --      Pain Score       No Pain           Vitals:    07/01/24 2208   BP: 131/81   Pulse: 71   Patient Position - Orthostatic VS: Sitting         Visual Acuity      ED Medications  Medications - No data to display    Diagnostic Studies  Results Reviewed       None                   No orders to display              Procedures  Procedures         ED Course                                             Medical Decision Making  48-year-old male presenting to ED today with right eye pain.  Differential at this time includes musculoskeletal strain versus blood clot (DVT).  Blood clot very low on my differential given no risk factors and intermittent pain and no swelling on exam.  Likely musculoskeletal strain or ligamentous injury.  Encouraged him to take Tylenol or ibuprofen should he have recurrence of the pain.  Encouraged outpatient follow-up with primary care provider.  Return precautions discussed and patient discharged home.             Disposition  Final diagnoses:   Thigh sprain, right, initial encounter     Time reflects when diagnosis was documented in both MDM as applicable and the Disposition within this note       Time User Action Codes Description Comment    7/1/2024 10:57 PM Clarke Shaver Add [S73.101A] Thigh sprain, right, initial encounter           ED Disposition       ED Disposition    Discharge    Condition   Stable    Date/Time   Mon Jul 1, 2024 10:57 PM    Comment   Miguel Anderson discharge to home/self care.                   Follow-up Information       Follow up With Specialties Details Why Contact Info Additional Information    Jeanmarie Apple MD Family Medicine Schedule an appointment as soon as possible for a visit in 2 days for follow up [address] [place] [id number]  [phone]       [facility] Emergency Medicine Go to  If symptoms worsen, As needed [address] [facility] [address]  [phone] [facility] Emergency Department, [address], [place], [address]            Discharge Medication List as of 7/1/2024 10:58 PM        CONTINUE these medications which have NOT CHANGED    Details   atorvastatin (LIPITOR) 10 mg tablet Take 10 mg by mouth daily , Starting Mon 6/1/2020, Historical Med             No discharge procedures on file.    PDMP Review       None            ED Provider  Electronically Signed by             Clarke Shaver MD  07/01/24 9112

## 2024-07-02 NOTE — DISCHARGE INSTRUCTIONS
If your pain starts again you can take 600 mg of ibuprofen every 6 hours as needed for pain.  Please follow-up with your primary care doctor.  Return to ER if you develop any chest pain or shortness of breath.